# Patient Record
Sex: FEMALE | Race: BLACK OR AFRICAN AMERICAN | NOT HISPANIC OR LATINO | Employment: FULL TIME | URBAN - METROPOLITAN AREA
[De-identification: names, ages, dates, MRNs, and addresses within clinical notes are randomized per-mention and may not be internally consistent; named-entity substitution may affect disease eponyms.]

---

## 2021-05-03 ENCOUNTER — MEDICAL CORRESPONDENCE (OUTPATIENT)
Dept: HEALTH INFORMATION MANAGEMENT | Facility: CLINIC | Age: 31
End: 2021-05-03

## 2021-05-05 ENCOUNTER — HOSPITAL ENCOUNTER (OUTPATIENT)
Dept: LAB | Facility: CLINIC | Age: 31
Discharge: HOME | End: 2021-05-05
Attending: PSYCHIATRY & NEUROLOGY | Admitting: PSYCHIATRY & NEUROLOGY
Payer: COMMERCIAL

## 2021-05-05 DIAGNOSIS — R47.9 SPEECH DISTURBANCE: ICD-10-CM

## 2021-05-05 DIAGNOSIS — R51.9 FACIAL PAIN: ICD-10-CM

## 2021-05-05 DIAGNOSIS — I99.9 PERIPHERAL VASCULAR COMPLICATION: Primary | ICD-10-CM

## 2021-05-05 LAB
B BURGDOR IGG+IGM SER QL: 0.28 (ref 0–0.89)
CRP SERPL-MCNC: <2.9 MG/L (ref 0–8)
HIV 1+2 AB+HIV1 P24 AG SERPL QL IA: NONREACTIVE
T PALLIDUM AB SER QL: NONREACTIVE

## 2021-05-05 PROCEDURE — 86038 ANTINUCLEAR ANTIBODIES: CPT | Performed by: PSYCHIATRY & NEUROLOGY

## 2021-05-05 PROCEDURE — 86160 COMPLEMENT ANTIGEN: CPT | Performed by: PSYCHIATRY & NEUROLOGY

## 2021-05-05 PROCEDURE — 86140 C-REACTIVE PROTEIN: CPT | Performed by: PSYCHIATRY & NEUROLOGY

## 2021-05-05 PROCEDURE — 86225 DNA ANTIBODY NATIVE: CPT | Performed by: PSYCHIATRY & NEUROLOGY

## 2021-05-05 PROCEDURE — 86431 RHEUMATOID FACTOR QUANT: CPT | Performed by: PSYCHIATRY & NEUROLOGY

## 2021-05-05 PROCEDURE — 86255 FLUORESCENT ANTIBODY SCREEN: CPT | Performed by: PSYCHIATRY & NEUROLOGY

## 2021-05-05 PROCEDURE — 86618 LYME DISEASE ANTIBODY: CPT | Performed by: PSYCHIATRY & NEUROLOGY

## 2021-05-05 PROCEDURE — 86780 TREPONEMA PALLIDUM: CPT | Performed by: PSYCHIATRY & NEUROLOGY

## 2021-05-05 PROCEDURE — 36415 COLL VENOUS BLD VENIPUNCTURE: CPT | Performed by: PSYCHIATRY & NEUROLOGY

## 2021-05-05 PROCEDURE — 87389 HIV-1 AG W/HIV-1&-2 AB AG IA: CPT | Performed by: PSYCHIATRY & NEUROLOGY

## 2021-05-06 LAB
ANA SER QL IF: NEGATIVE
ANCA AB PATTERN SER IF-IMP: NORMAL
C-ANCA TITR SER IF: NORMAL {TITER}
C3 SERPL-MCNC: 95 MG/DL (ref 81–157)
C4 SERPL-MCNC: 17 MG/DL (ref 13–39)
DSDNA AB SER-ACNC: 2 IU/ML
RHEUMATOID FACT SER NEPH-ACNC: <7 IU/ML (ref 0–20)

## 2021-11-13 ENCOUNTER — HOSPITAL ENCOUNTER (EMERGENCY)
Facility: CLINIC | Age: 31
Discharge: HOME | End: 2021-11-13
Attending: EMERGENCY MEDICINE | Admitting: EMERGENCY MEDICINE
Payer: COMMERCIAL

## 2021-11-13 ENCOUNTER — APPOINTMENT (OUTPATIENT)
Dept: CT IMAGING | Facility: CLINIC | Age: 31
End: 2021-11-13
Attending: EMERGENCY MEDICINE
Payer: COMMERCIAL

## 2021-11-13 VITALS
HEIGHT: 68 IN | SYSTOLIC BLOOD PRESSURE: 126 MMHG | DIASTOLIC BLOOD PRESSURE: 94 MMHG | HEART RATE: 71 BPM | BODY MASS INDEX: 26.22 KG/M2 | TEMPERATURE: 98.1 F | RESPIRATION RATE: 16 BRPM | OXYGEN SATURATION: 100 % | WEIGHT: 173 LBS

## 2021-11-13 DIAGNOSIS — G43.809 OTHER MIGRAINE WITHOUT STATUS MIGRAINOSUS, NOT INTRACTABLE: ICD-10-CM

## 2021-11-13 LAB
ANION GAP SERPL CALCULATED.3IONS-SCNC: 1 MMOL/L (ref 3–14)
B-HCG FREE SERPL-ACNC: <5 IU/L (ref 0–5)
BASOPHILS # BLD AUTO: 0 10E3/UL (ref 0–0.2)
BASOPHILS NFR BLD AUTO: 0 %
BUN SERPL-MCNC: 10 MG/DL (ref 7–30)
CALCIUM SERPL-MCNC: 8.8 MG/DL (ref 8.5–10.1)
CHLORIDE BLD-SCNC: 109 MMOL/L (ref 94–109)
CO2 SERPL-SCNC: 33 MMOL/L (ref 20–32)
CREAT SERPL-MCNC: 0.95 MG/DL (ref 0.52–1.04)
EOSINOPHIL # BLD AUTO: 0.5 10E3/UL (ref 0–0.7)
EOSINOPHIL NFR BLD AUTO: 6 %
ERYTHROCYTE [DISTWIDTH] IN BLOOD BY AUTOMATED COUNT: 13.3 % (ref 10–15)
GFR SERPL CREATININE-BSD FRML MDRD: 79 ML/MIN/1.73M2
GLUCOSE BLD-MCNC: 97 MG/DL (ref 70–99)
HCG SERPL QL: NEGATIVE
HCT VFR BLD AUTO: 38.1 % (ref 35–47)
HGB BLD-MCNC: 11.8 G/DL (ref 11.7–15.7)
IMM GRANULOCYTES # BLD: 0 10E3/UL
IMM GRANULOCYTES NFR BLD: 0 %
LYMPHOCYTES # BLD AUTO: 3.2 10E3/UL (ref 0.8–5.3)
LYMPHOCYTES NFR BLD AUTO: 38 %
MCH RBC QN AUTO: 26 PG (ref 26.5–33)
MCHC RBC AUTO-ENTMCNC: 31 G/DL (ref 31.5–36.5)
MCV RBC AUTO: 84 FL (ref 78–100)
MONOCYTES # BLD AUTO: 0.6 10E3/UL (ref 0–1.3)
MONOCYTES NFR BLD AUTO: 7 %
NEUTROPHILS # BLD AUTO: 4.2 10E3/UL (ref 1.6–8.3)
NEUTROPHILS NFR BLD AUTO: 49 %
NRBC # BLD AUTO: 0 10E3/UL
NRBC BLD AUTO-RTO: 0 /100
PLATELET # BLD AUTO: 291 10E3/UL (ref 150–450)
POTASSIUM BLD-SCNC: 3.5 MMOL/L (ref 3.4–5.3)
RBC # BLD AUTO: 4.54 10E6/UL (ref 3.8–5.2)
SODIUM SERPL-SCNC: 143 MMOL/L (ref 133–144)
WBC # BLD AUTO: 8.5 10E3/UL (ref 4–11)

## 2021-11-13 PROCEDURE — 84703 CHORIONIC GONADOTROPIN ASSAY: CPT | Performed by: EMERGENCY MEDICINE

## 2021-11-13 PROCEDURE — 250N000009 HC RX 250: Performed by: EMERGENCY MEDICINE

## 2021-11-13 PROCEDURE — 96375 TX/PRO/DX INJ NEW DRUG ADDON: CPT

## 2021-11-13 PROCEDURE — 258N000003 HC RX IP 258 OP 636: Performed by: EMERGENCY MEDICINE

## 2021-11-13 PROCEDURE — 96374 THER/PROPH/DIAG INJ IV PUSH: CPT

## 2021-11-13 PROCEDURE — 85025 COMPLETE CBC W/AUTO DIFF WBC: CPT | Performed by: EMERGENCY MEDICINE

## 2021-11-13 PROCEDURE — 99285 EMERGENCY DEPT VISIT HI MDM: CPT | Mod: 25

## 2021-11-13 PROCEDURE — 36415 COLL VENOUS BLD VENIPUNCTURE: CPT | Performed by: EMERGENCY MEDICINE

## 2021-11-13 PROCEDURE — 96361 HYDRATE IV INFUSION ADD-ON: CPT

## 2021-11-13 PROCEDURE — 84702 CHORIONIC GONADOTROPIN TEST: CPT

## 2021-11-13 PROCEDURE — 80048 BASIC METABOLIC PNL TOTAL CA: CPT | Performed by: EMERGENCY MEDICINE

## 2021-11-13 PROCEDURE — 70450 CT HEAD/BRAIN W/O DYE: CPT

## 2021-11-13 PROCEDURE — 250N000011 HC RX IP 250 OP 636: Performed by: EMERGENCY MEDICINE

## 2021-11-13 RX ORDER — DIPHENHYDRAMINE HYDROCHLORIDE 50 MG/ML
50 INJECTION INTRAMUSCULAR; INTRAVENOUS ONCE
Status: COMPLETED | OUTPATIENT
Start: 2021-11-13 | End: 2021-11-13

## 2021-11-13 RX ORDER — KETOROLAC TROMETHAMINE 15 MG/ML
10 INJECTION, SOLUTION INTRAMUSCULAR; INTRAVENOUS ONCE
Status: COMPLETED | OUTPATIENT
Start: 2021-11-13 | End: 2021-11-13

## 2021-11-13 RX ORDER — LIDOCAINE HYDROCHLORIDE 40 MG/ML
.5-1 INJECTION, SOLUTION RETROBULBAR ONCE
Status: COMPLETED | OUTPATIENT
Start: 2021-11-13 | End: 2021-11-13

## 2021-11-13 RX ADMIN — SODIUM CHLORIDE 1000 ML: 9 INJECTION, SOLUTION INTRAVENOUS at 02:35

## 2021-11-13 RX ADMIN — LIDOCAINE HYDROCHLORIDE 1 ML: 40 INJECTION, SOLUTION RETROBULBAR; TOPICAL at 02:46

## 2021-11-13 RX ADMIN — DIPHENHYDRAMINE HYDROCHLORIDE 50 MG: 50 INJECTION INTRAMUSCULAR; INTRAVENOUS at 02:36

## 2021-11-13 RX ADMIN — KETOROLAC TROMETHAMINE 10 MG: 15 INJECTION, SOLUTION INTRAMUSCULAR; INTRAVENOUS at 03:44

## 2021-11-13 RX ADMIN — PROCHLORPERAZINE EDISYLATE 10 MG: 5 INJECTION INTRAMUSCULAR; INTRAVENOUS at 02:36

## 2021-11-13 ASSESSMENT — MIFFLIN-ST. JEOR: SCORE: 1538.22

## 2021-11-13 NOTE — ED TRIAGE NOTES
Here for migraine headache with sharp pain behind bilateral eye. Stated history of brain aneurysm in 2018. Also c/o light/noise sensitivity, stuttering her speech, otherwise no other neuro deficit. ABCs intact.

## 2021-11-13 NOTE — ED PROVIDER NOTES
Visit Date: 11/13/2021    CHIEF COMPLAINT:  Headache.    HISTORY OF PRESENT ILLNESS:  This is a 31-year-old female that has a history of coiled aneurysm x 2 back in 2018 with a history of migraines, who is here complaining of new onset headache that started this morning.  It was mild and gradual in presentation and gradually worsened to 10/10 headache now.  She describes it as frontal pain sensation and associated with light hurting her eyes and mild nausea, no vomiting, no fever, no neck pain.  The patient states that this does feel like a migraine that she has had in the past, just worse than usual.    ALLERGIES:  NONE.    MEDICATIONS:  None.    PAST MEDICAL HISTORY:  Aneurysm.    PAST SURGICAL HISTORY:  Coiled aneurysm x 2.    SOCIAL HISTORY:  The patient denies alcohol use.  Denies drug use.    REVIEW OF SYSTEMS:    CONSTITUTIONAL:  Negative for fever.  NEUROLOGICAL:  Positive for headaches.    All other review of systems are negative.    PHYSICAL EXAMINATION:    VITAL SIGNS:  Blood pressure 126/94, temperature 98.1 degrees Fahrenheit, pulse is 71, respiratory rate 16, pulse ox 100% on room air.  GENERAL:  The patient is in mild distress secondary due to pain.    EYES:  Pupils are equal and react to light.  Extraocular motors intact.    ORAL:  No erythema.  Moist mucous membranes.  NECK:  Supple.  HEART:  S1, S2 regular rate and rhythm.  No murmurs, rubs, or gallops.  LUNGS:  Clear to auscultation bilaterally.  No wheeze, rales, or rhonchi.  ABDOMEN:  Bowel sounds are positive.  Soft, nontender, nondistended, no organomegaly.  MUSCULOSKELETAL:  2+ distal pulses.  NEUROLOGIC:  The patient is awake, alert and oriented x 3.  Cranial nerves are grossly intact.  She has no meningeal signs noted on exam.  DERMATOLOGIC:  Non-pallor.    CT head, impression:  No acute intracranial process. No change from prior.    LABORATORY DATA:  BMP is within normal limits.  HCG qualitative negative.  CBC within normal  limits.    EMERGENCY DEPARTMENT COURSE AND TREATMENT:  The patient was seen by ED physician and ED nurse.  All findings were reviewed.  All questions were answered.  Upon reevaluation, pain was greatly improved.  The patient was discharged home.    INTERVENTIONS:     1.  Normal saline, 1 liter IV.  2.  Benadryl 50 mg IV.  3.  Toradol 10 mg IV.  4.  Lidocaine 4% intranasal.  5.  Compazine 10 mg IV.    MEDICAL DECISION MAKING:  This 31-year-old female with history of aneurysm, status post coiling , history of migraines, who is here with new onset headache.  The patient states that this is similar to migraines in the past, although worse.  Differential includes subarachnoid hemorrhage, migraine, tension headache or other causes.  With her previous history I did obtain a CT of her head to evaluate for possible intracranial hemorrhage, which is negative.  Symptoms would not be suggestive of subarachnoid hemorrhage with her classic presentation of migraine, gradual presentation; therefore, I would highly doubt missed subarachnoid hemorrhage and/or need for further imaging or lumbar puncture.  Upon reevaluation pain is currently greatly improved and she is currently appropriate for outpatient management.  She is told to follow up with primary doctor and her neurologist and to return for any worsening headache, nausea, vomiting, photophobia, fever, any new symptoms or concerns.    DISPOSITION:  Home.  Follow up with PMD.    DIAGNOSIS:  Migraine.    Alek Quinones MD        D: 2021   T: 2021   MT: FATOU    Name:     HENRY CORDOBA  MRN:      [id number]        Account:    [id number]   :      1990           Visit Date: 2021     Document: [id number]